# Patient Record
Sex: FEMALE | Race: WHITE | NOT HISPANIC OR LATINO | Employment: FULL TIME | URBAN - NONMETROPOLITAN AREA
[De-identification: names, ages, dates, MRNs, and addresses within clinical notes are randomized per-mention and may not be internally consistent; named-entity substitution may affect disease eponyms.]

---

## 2022-02-17 ENCOUNTER — TRANSCRIBE ORDERS (OUTPATIENT)
Dept: HOME HEALTH SERVICES | Facility: HOME HEALTHCARE | Age: 62
End: 2022-02-17

## 2022-02-17 ENCOUNTER — HOME HEALTH ADMISSION (OUTPATIENT)
Dept: HOME HEALTH SERVICES | Facility: HOME HEALTHCARE | Age: 62
End: 2022-02-17

## 2022-02-17 DIAGNOSIS — R55 SYNCOPE, UNSPECIFIED SYNCOPE TYPE: ICD-10-CM

## 2022-02-17 DIAGNOSIS — Z96.652 TOTAL KNEE REPLACEMENT STATUS, LEFT: Primary | ICD-10-CM

## 2022-02-18 ENCOUNTER — HOME CARE VISIT (OUTPATIENT)
Dept: HOME HEALTH SERVICES | Facility: CLINIC | Age: 62
End: 2022-02-18

## 2022-02-18 PROCEDURE — G0151 HHCP-SERV OF PT,EA 15 MIN: HCPCS

## 2022-02-20 VITALS
HEART RATE: 98 BPM | SYSTOLIC BLOOD PRESSURE: 112 MMHG | DIASTOLIC BLOOD PRESSURE: 72 MMHG | TEMPERATURE: 97.5 F | RESPIRATION RATE: 18 BRPM | OXYGEN SATURATION: 94 %

## 2022-02-20 NOTE — HOME HEALTH
62 yo female dx L TKA 2-14-22 . Pt reports chronic L knee pain 2014 progressive     Pmhx : nonremarkable   PLOF : indepndent   - parttime employeed - computer [employer]   Home environment : 3 step entry   DME needs : na   Homebound status assessment : Pt presents with altered gait , balance , strength and endurance per dx     Skills :Education in ther ex  to faciltate functional strength for transfers and mobility - reduce fall risk      Education in transfers with hand and AD placement for safety    Education in AD use to promote reduced fall risk - safe functional gait     Education on proper hand/foot placement, transitional movements, and safety awareness to decrease risk of falls  Pt requires VCs - TCs with teachback method requiring review and reitaration for pt and PSP to promote safe functional mobility   Tx established  to promote safe mobility ,ability to attend to ADLs ,to reduce fall risk and to minimize need for further hospitalization .    pt cell # [phone] primary

## 2022-02-21 ENCOUNTER — HOME CARE VISIT (OUTPATIENT)
Dept: HOME HEALTH SERVICES | Facility: CLINIC | Age: 62
End: 2022-02-21

## 2022-02-21 VITALS
HEART RATE: 79 BPM | DIASTOLIC BLOOD PRESSURE: 86 MMHG | OXYGEN SATURATION: 97 % | SYSTOLIC BLOOD PRESSURE: 122 MMHG | TEMPERATURE: 97.6 F | RESPIRATION RATE: 18 BRPM

## 2022-02-21 PROCEDURE — G0299 HHS/HOSPICE OF RN EA 15 MIN: HCPCS

## 2022-02-22 ENCOUNTER — HOME CARE VISIT (OUTPATIENT)
Dept: HOME HEALTH SERVICES | Facility: CLINIC | Age: 62
End: 2022-02-22

## 2022-02-22 VITALS
RESPIRATION RATE: 18 BRPM | TEMPERATURE: 98.9 F | DIASTOLIC BLOOD PRESSURE: 56 MMHG | HEART RATE: 74 BPM | OXYGEN SATURATION: 95 % | SYSTOLIC BLOOD PRESSURE: 100 MMHG

## 2022-02-22 PROCEDURE — G0157 HHC PT ASSISTANT EA 15: HCPCS

## 2022-02-22 NOTE — HOME HEALTH
Pt states no falls or changes to insurance or medication. Next visit to educate on progressive HEP and gt training on outdoor surfaces-weather permitting.
Normal

## 2022-02-22 NOTE — HOME HEALTH
FOCUSED CARE/SKILL NEED: SN EVAL OF SURGERY TO LEFT KNEE. MED EDUCATION. TEACHBACK AND EDUCATION OF MEDS, FALL RISK/SAFETY IN HOME, PAIN RELIEF.    PLAN FOR NEXT VISIT: ASSESS INCISION AFTER F/U WITH SURGEON, IF STABLE DC.

## 2022-02-23 ENCOUNTER — HOME CARE VISIT (OUTPATIENT)
Dept: HOME HEALTH SERVICES | Facility: HOME HEALTHCARE | Age: 62
End: 2022-02-23

## 2022-02-24 ENCOUNTER — HOME CARE VISIT (OUTPATIENT)
Dept: HOME HEALTH SERVICES | Facility: CLINIC | Age: 62
End: 2022-02-24

## 2022-02-24 VITALS
HEART RATE: 74 BPM | DIASTOLIC BLOOD PRESSURE: 60 MMHG | TEMPERATURE: 98.2 F | SYSTOLIC BLOOD PRESSURE: 108 MMHG | RESPIRATION RATE: 74 BRPM | OXYGEN SATURATION: 95 %

## 2022-02-24 PROCEDURE — G0157 HHC PT ASSISTANT EA 15: HCPCS

## 2022-02-24 NOTE — HOME HEALTH
Pt states no falls or changes to insurance or medication. Next visit to focus on progressive HEP and gt training outdoors if weather permits.

## 2022-02-25 ENCOUNTER — HOME CARE VISIT (OUTPATIENT)
Dept: HOME HEALTH SERVICES | Facility: CLINIC | Age: 62
End: 2022-02-25

## 2022-02-25 VITALS
DIASTOLIC BLOOD PRESSURE: 82 MMHG | TEMPERATURE: 96.1 F | OXYGEN SATURATION: 97 % | SYSTOLIC BLOOD PRESSURE: 156 MMHG | RESPIRATION RATE: 18 BRPM | HEART RATE: 77 BPM

## 2022-02-25 PROCEDURE — G0157 HHC PT ASSISTANT EA 15: HCPCS

## 2022-02-25 NOTE — HOME HEALTH
Pt states no falls or changes to insurance or medication. Next visit to focus on outdoors amb, improving ROM, and educate on standing progressive ex technique.

## 2022-02-28 ENCOUNTER — HOME CARE VISIT (OUTPATIENT)
Dept: HOME HEALTH SERVICES | Facility: CLINIC | Age: 62
End: 2022-02-28

## 2022-02-28 VITALS
DIASTOLIC BLOOD PRESSURE: 88 MMHG | TEMPERATURE: 98 F | OXYGEN SATURATION: 98 % | HEART RATE: 89 BPM | SYSTOLIC BLOOD PRESSURE: 140 MMHG | RESPIRATION RATE: 18 BRPM

## 2022-02-28 PROCEDURE — G0157 HHC PT ASSISTANT EA 15: HCPCS

## 2022-02-28 NOTE — HOME HEALTH
Pt states no falls or changes to insurance or medication. Pt has progressed well w/all goals met this date. Discussed d/c plans.

## 2022-02-28 NOTE — CASE COMMUNICATION
Patient d/c'd from PT rx on 2/28/22 due to all goals met. Patient plans to attend out-pt therapy next week.

## 2022-03-03 ENCOUNTER — HOME CARE VISIT (OUTPATIENT)
Dept: HOME HEALTH SERVICES | Facility: CLINIC | Age: 62
End: 2022-03-03

## 2022-03-03 VITALS
OXYGEN SATURATION: 99 % | DIASTOLIC BLOOD PRESSURE: 70 MMHG | SYSTOLIC BLOOD PRESSURE: 138 MMHG | TEMPERATURE: 98.6 F | RESPIRATION RATE: 16 BRPM | HEART RATE: 87 BPM

## 2022-03-03 PROCEDURE — G0495 RN CARE TRAIN/EDU IN HH: HCPCS

## 2022-03-03 NOTE — HOME HEALTH
FOCUS OF CARE/SKILLED NEED: AGENCY DISCHARGE    TEACHING/INTERVENTIONS: Patient agreeable to homecare discharge. Discharge instructions reviewed and signed by the pt. Medication reconciliation completed and no issues found. Pt had no further questions or concerns regarding the pt's medications or discharge. A&O X 4. No c/o pain. VSS.     PROGRESS TOWARD GOALS: All goal met. Pt is starting outpatient therapy on 3/7.    PHYSICIAN CONTACT: No need.    INSURANCE CHANGES? No    FALLS SINCE LAST VISIT? NO    MEDICATION CHANGES SINCE LAST VISIT? No

## 2023-11-08 ENCOUNTER — HOME HEALTH ADMISSION (OUTPATIENT)
Dept: HOME HEALTH SERVICES | Facility: HOME HEALTHCARE | Age: 63
End: 2023-11-08
Payer: COMMERCIAL